# Patient Record
Sex: MALE | Race: WHITE | NOT HISPANIC OR LATINO | URBAN - METROPOLITAN AREA
[De-identification: names, ages, dates, MRNs, and addresses within clinical notes are randomized per-mention and may not be internally consistent; named-entity substitution may affect disease eponyms.]

---

## 2019-11-05 ENCOUNTER — INPATIENT HOSPITAL (INPATIENT)
Dept: URBAN - METROPOLITAN AREA HOSPITAL 32 | Facility: HOSPITAL | Age: 34
End: 2019-11-05
Payer: COMMERCIAL

## 2019-11-05 DIAGNOSIS — R17 UNSPECIFIED JAUNDICE: ICD-10-CM

## 2019-11-05 DIAGNOSIS — R74.8 ABNORMAL LEVELS OF OTHER SERUM ENZYMES: ICD-10-CM

## 2019-11-05 DIAGNOSIS — R10.11 RIGHT UPPER QUADRANT PAIN: ICD-10-CM

## 2019-11-05 PROCEDURE — 99222 1ST HOSP IP/OBS MODERATE 55: CPT

## 2019-11-05 PROCEDURE — 99254 IP/OBS CNSLTJ NEW/EST MOD 60: CPT

## 2019-11-06 ENCOUNTER — INPATIENT HOSPITAL (INPATIENT)
Dept: URBAN - METROPOLITAN AREA HOSPITAL 32 | Facility: HOSPITAL | Age: 34
End: 2019-11-06

## 2019-11-06 DIAGNOSIS — R10.11 RIGHT UPPER QUADRANT PAIN: ICD-10-CM

## 2019-11-06 DIAGNOSIS — R74.8 ABNORMAL LEVELS OF OTHER SERUM ENZYMES: ICD-10-CM

## 2019-11-06 DIAGNOSIS — R17 UNSPECIFIED JAUNDICE: ICD-10-CM

## 2019-11-06 PROCEDURE — 99232 SBSQ HOSP IP/OBS MODERATE 35: CPT

## 2020-05-20 ENCOUNTER — OFFICE (INPATIENT)
Dept: URBAN - METROPOLITAN AREA CLINIC 78 | Facility: CLINIC | Age: 35
End: 2020-05-20

## 2020-05-20 PROCEDURE — 00014: CPT

## 2021-08-13 ENCOUNTER — OFFICE (INPATIENT)
Dept: URBAN - METROPOLITAN AREA CLINIC 34 | Facility: CLINIC | Age: 36
End: 2021-08-13

## 2021-08-13 VITALS
DIASTOLIC BLOOD PRESSURE: 78 MMHG | TEMPERATURE: 97.9 F | WEIGHT: 172 LBS | HEART RATE: 51 BPM | HEIGHT: 72 IN | SYSTOLIC BLOOD PRESSURE: 129 MMHG

## 2021-08-13 DIAGNOSIS — R74.8 ABNORMAL LEVELS OF OTHER SERUM ENZYMES: ICD-10-CM

## 2021-08-13 PROCEDURE — 99215 OFFICE O/P EST HI 40 MIN: CPT | Performed by: PHYSICIAN ASSISTANT

## 2021-08-13 NOTE — SERVICEHPINOTES
36 yo male with h/o alcoholism presents for f/u after ER visit in June for jaundice. He goes to AA but reports binge drinking fairly often and was in the hospital in 2019 for this, at which time evaluation was felt to be c/w ETOH use. He was to have followed up with us but failed to do so. He reports no ETOH for awhile now, and says his jaundice cleared up in July and he has been doing pretty well overall. He reports recent labs with PCP and has an U/S scheduled in the near future. He is not sure of current meds. He has pruritis. Can have some upper abdominal pains at times. No other GI concerns today. No fluid overload.  6/30/21 plt 76, AST//167, alk phos 653, bili 5.1, ETOH [id number] - acute hepatitis panel negative, LAILA/ASMA/AMA neg, A1AT &amp ceruloplasmin elevated

## 2021-10-08 ENCOUNTER — OFFICE (INPATIENT)
Dept: URBAN - METROPOLITAN AREA CLINIC 34 | Facility: CLINIC | Age: 36
End: 2021-10-08

## 2021-10-08 VITALS
DIASTOLIC BLOOD PRESSURE: 76 MMHG | HEIGHT: 72 IN | WEIGHT: 156 LBS | TEMPERATURE: 97 F | HEART RATE: 70 BPM | SYSTOLIC BLOOD PRESSURE: 120 MMHG

## 2021-10-08 DIAGNOSIS — R74.8 ABNORMAL LEVELS OF OTHER SERUM ENZYMES: ICD-10-CM

## 2021-10-08 PROCEDURE — 99214 OFFICE O/P EST MOD 30 MIN: CPT | Performed by: PHYSICIAN ASSISTANT

## 2021-10-08 RX ORDER — BACLOFEN 10 MG/1
TABLET ORAL
Qty: 90 | Refills: 2 | Status: ACTIVE
Start: 2021-10-08

## 2021-10-08 NOTE — SERVICEHPINOTES
34 yo male with h/o alcoholism presents for f/u liver disease. Was seen in August after ER visit in June for jaundice. He goes to AA but reports binge drinking fairly often and was in the hospital in 2019 for this, at which time evaluation was felt to be c/w ETOH use. He was to have followed up with us but failed to do so at that time. 
brandin ghotra He had stopped drinking after ER visit this year, and his jaundice was better by July. He has been trying not to drink but hasn't been able to completely avoid it. He has high stress levels and is visibly anxious and agitated during visit today. He does have a counselor but may be switching. 
brandin ghotraWe had ordered CBC, CMP, PT/INR but he just had CBC and hepatic panel with PCP. He had an U/S and elastography at [facility] suggesting cirrhosis as well as splenomegaly.
br

brandin Other than mental stress, he feels ok overall. No abdominal pain or fluid overload. 
br
brandin He is not sure why he is on ursodiol. He denies prior diagnosis of PSC. He reports normal bowel habits. 
brandin ghotra Has lost weight which he says he normally does every summer, though feels this summer was worse. Currently stressed with medical and other bills. brandin ghotra 9/3/21 WBC 5.2, Hgb 13.5, plt 105, alb 4.3, AST/ALT 48/21, bili 1.1, alk phos 292br6/30/21 plt 76, AST//167, alk phos 653, bili 5.1, ETOH [id number] - acute hepatitis panel negative, LAILA/ASMA/AMA neg, A1AT &amp ceruloplasmin elevated